# Patient Record
Sex: FEMALE | Race: OTHER | Employment: FULL TIME | URBAN - METROPOLITAN AREA
[De-identification: names, ages, dates, MRNs, and addresses within clinical notes are randomized per-mention and may not be internally consistent; named-entity substitution may affect disease eponyms.]

---

## 2021-01-05 ENCOUNTER — TRANSCRIBE ORDER (OUTPATIENT)
Dept: REGISTRATION | Age: 66
End: 2021-01-05

## 2021-01-05 NOTE — PERIOP NOTES
PAT - SURGICAL PRE-ADMISSION INSTRUCTIONS    NAME:  Armaan Porras                                                          TODAY'S DATE:  1/5/2021    SURGERY DATE:  1/11/2021                                  SURGERY ARRIVAL TIME:   0530 TBV    1. Do NOT eat or drink anything, including candy or gum, after MIDNIGHT on 1/10/21 , unless you have specific instructions from your Surgeon or Anesthesia Provider to do so. 2. No smoking on the day of surgery. 3. No alcohol 24 hours prior to the day of surgery. 4. No recreational drugs for one week prior to the day of surgery. 5. Leave all valuables, including money/purse, at home. 6. Remove all jewelry, nail polish, makeup (including mascara); no lotions, powders, deodorant, or perfume/cologne/after shave. 7. Glasses/Contact lenses and Dentures may be worn to the hospital.  They will be removed prior to surgery. 8. Call your doctor if symptoms of a cold or illness develop within 24 ours prior to surgery. 9. AN ADULT MUST DRIVE YOU HOME AFTER OUTPATIENT SURGERY. 10. If you are having an OUTPATIENT procedure, please make arrangements for a responsible adult to be with you for 24 hours after your surgery. 11. If you are admitted to the hospital, you will be assigned to a bed after surgery is complete. Normally a family member will not be able to see you until you are in your assigned bed. 15. Family is encouraged to accompany you to the hospital.  We ask visitors in the treatment area to be limited to ONE person at a time to ensure patient privacy. EXCEPTIONS WILL BE MADE AS NEEDED. 15. Children under 12 are discouraged from entering the treatment area and need to be supervised by an adult when in the waiting room. Special Instructions:    NONE. Patient Prep:    shower with anti-bacterial soap    These surgical instructions were reviewed with PATIENT during the PAT PHONE CALL. Directions: On the morning of surgery, please go to the MAIN ENTRANCE.     Sign in at the Registration Desk.     If you have any questions and/or concerns, please do not hesitate to call:  (Prior to the day of surgery)  [facility] unit:  [phone]  (Day of surgery)  [facility] unit:  [phone]

## 2021-01-07 ENCOUNTER — HOSPITAL ENCOUNTER (OUTPATIENT)
Dept: PREADMISSION TESTING | Age: 66
Discharge: HOME | End: 2021-01-07
Payer: MEDICARE

## 2021-01-07 ENCOUNTER — TRANSCRIBE ORDER (OUTPATIENT)
Dept: REGISTRATION | Age: 66
End: 2021-01-07

## 2021-01-07 DIAGNOSIS — Z01.812 BLOOD TESTS PRIOR TO TREATMENT OR PROCEDURE: Primary | ICD-10-CM

## 2021-01-07 DIAGNOSIS — Z01.812 BLOOD TESTS PRIOR TO TREATMENT OR PROCEDURE: ICD-10-CM

## 2021-01-07 DIAGNOSIS — Z20.828 EXPOSURE TO SARS-ASSOCIATED CORONAVIRUS: ICD-10-CM

## 2021-01-07 PROCEDURE — 36415 COLL VENOUS BLD VENIPUNCTURE: CPT

## 2021-01-07 PROCEDURE — 87635 SARS-COV-2 COVID-19 AMP PRB: CPT

## 2021-01-08 LAB — SARS-COV-2, COV2NT: NOT DETECTED

## 2021-01-11 ENCOUNTER — HOSPITAL ENCOUNTER (OUTPATIENT)
Age: 66
Setting detail: OUTPATIENT SURGERY
Discharge: HOME | End: 2021-01-11
Attending: PODIATRIST | Admitting: PODIATRIST
Payer: MEDICARE

## 2021-01-11 ENCOUNTER — ANESTHESIA (OUTPATIENT)
Dept: SURGERY | Age: 66
End: 2021-01-11
Payer: MEDICARE

## 2021-01-11 ENCOUNTER — ANESTHESIA EVENT (OUTPATIENT)
Dept: SURGERY | Age: 66
End: 2021-01-11
Payer: MEDICARE

## 2021-01-11 VITALS
HEART RATE: 60 BPM | HEIGHT: 63 IN | TEMPERATURE: 97.4 F | DIASTOLIC BLOOD PRESSURE: 51 MMHG | RESPIRATION RATE: 14 BRPM | SYSTOLIC BLOOD PRESSURE: 94 MMHG | WEIGHT: 128.7 LBS | BODY MASS INDEX: 22.8 KG/M2 | OXYGEN SATURATION: 100 %

## 2021-01-11 PROCEDURE — 74011250636 HC RX REV CODE- 250/636: Performed by: NURSE ANESTHETIST, CERTIFIED REGISTERED

## 2021-01-11 PROCEDURE — 74011000250 HC RX REV CODE- 250: Performed by: PODIATRIST

## 2021-01-11 PROCEDURE — 2709999900 HC NON-CHARGEABLE SUPPLY: Performed by: PODIATRIST

## 2021-01-11 PROCEDURE — 01480 ANES OPEN PX LOWER L/A/F NOS: CPT | Performed by: ANESTHESIOLOGY

## 2021-01-11 PROCEDURE — 77030036714 HC WRE K FIXOS STRY -B: Performed by: PODIATRIST

## 2021-01-11 PROCEDURE — 77030040361 HC SLV COMPR DVT MDII -B: Performed by: PODIATRIST

## 2021-01-11 PROCEDURE — 77030020833 HC CAP PROTCT PIN ASPN -A: Performed by: PODIATRIST

## 2021-01-11 PROCEDURE — 74011250636 HC RX REV CODE- 250/636: Performed by: PODIATRIST

## 2021-01-11 PROCEDURE — 76060000036 HC ANESTHESIA 2.5 TO 3 HR: Performed by: PODIATRIST

## 2021-01-11 PROCEDURE — 77030000032 HC CUF TRNQT ZIMM -B: Performed by: PODIATRIST

## 2021-01-11 PROCEDURE — 76210000021 HC REC RM PH II 0.5 TO 1 HR: Performed by: PODIATRIST

## 2021-01-11 PROCEDURE — C1713 ANCHOR/SCREW BN/BN,TIS/BN: HCPCS | Performed by: PODIATRIST

## 2021-01-11 PROCEDURE — 77030010507 HC ADH SKN DERMBND J&J -B: Performed by: PODIATRIST

## 2021-01-11 PROCEDURE — 74011000250 HC RX REV CODE- 250: Performed by: NURSE ANESTHETIST, CERTIFIED REGISTERED

## 2021-01-11 PROCEDURE — 74011000258 HC RX REV CODE- 258: Performed by: NURSE ANESTHETIST, CERTIFIED REGISTERED

## 2021-01-11 PROCEDURE — 77030020753 HC CUF TRNQT 1BLA STRY -B: Performed by: PODIATRIST

## 2021-01-11 PROCEDURE — 77030002933 HC SUT MCRYL J&J -A: Performed by: PODIATRIST

## 2021-01-11 PROCEDURE — 77030006773 HC BLD SAW OSC BRSM -A: Performed by: PODIATRIST

## 2021-01-11 PROCEDURE — 74011000272 HC RX REV CODE- 272: Performed by: PODIATRIST

## 2021-01-11 PROCEDURE — 77030031139 HC SUT VCRL2 J&J -A: Performed by: PODIATRIST

## 2021-01-11 PROCEDURE — 76010000132 HC OR TIME 2.5 TO 3 HR: Performed by: PODIATRIST

## 2021-01-11 DEVICE — XPRESS STAPLE (8 X 8 X 8MM)
Type: IMPLANTABLE DEVICE | Site: TOE | Status: FUNCTIONAL
Brand: EASYCLIP

## 2021-01-11 DEVICE — CANNULATED SELF-TAPPING COMPRESSION SCREW
Type: IMPLANTABLE DEVICE | Site: FOOT | Status: FUNCTIONAL
Brand: FIXOS

## 2021-01-11 RX ORDER — DEXAMETHASONE SODIUM PHOSPHATE 4 MG/ML
INJECTION, SOLUTION INTRA-ARTICULAR; INTRALESIONAL; INTRAMUSCULAR; INTRAVENOUS; SOFT TISSUE AS NEEDED
Status: DISCONTINUED | OUTPATIENT
Start: 2021-01-11 | End: 2021-01-11 | Stop reason: HOSPADM

## 2021-01-11 RX ORDER — LIDOCAINE HYDROCHLORIDE 20 MG/ML
INJECTION, SOLUTION EPIDURAL; INFILTRATION; INTRACAUDAL; PERINEURAL AS NEEDED
Status: DISCONTINUED | OUTPATIENT
Start: 2021-01-11 | End: 2021-01-11 | Stop reason: HOSPADM

## 2021-01-11 RX ORDER — CEFAZOLIN SODIUM 2 G/50ML
2 SOLUTION INTRAVENOUS ONCE
Status: COMPLETED | OUTPATIENT
Start: 2021-01-11 | End: 2021-01-11

## 2021-01-11 RX ORDER — FENTANYL CITRATE 50 UG/ML
INJECTION, SOLUTION INTRAMUSCULAR; INTRAVENOUS AS NEEDED
Status: DISCONTINUED | OUTPATIENT
Start: 2021-01-11 | End: 2021-01-11 | Stop reason: HOSPADM

## 2021-01-11 RX ORDER — MAGNESIUM SULFATE 100 %
4 CRYSTALS MISCELLANEOUS AS NEEDED
Status: CANCELLED | OUTPATIENT
Start: 2021-01-11

## 2021-01-11 RX ORDER — SODIUM CHLORIDE 0.9 % (FLUSH) 0.9 %
5-40 SYRINGE (ML) INJECTION AS NEEDED
Status: CANCELLED | OUTPATIENT
Start: 2021-01-11

## 2021-01-11 RX ORDER — ONDANSETRON 2 MG/ML
INJECTION INTRAMUSCULAR; INTRAVENOUS AS NEEDED
Status: DISCONTINUED | OUTPATIENT
Start: 2021-01-11 | End: 2021-01-11 | Stop reason: HOSPADM

## 2021-01-11 RX ORDER — PROPOFOL 10 MG/ML
INJECTION, EMULSION INTRAVENOUS
Status: DISCONTINUED | OUTPATIENT
Start: 2021-01-11 | End: 2021-01-11 | Stop reason: HOSPADM

## 2021-01-11 RX ORDER — ONDANSETRON 2 MG/ML
4 INJECTION INTRAMUSCULAR; INTRAVENOUS ONCE
Status: CANCELLED | OUTPATIENT
Start: 2021-01-11 | End: 2021-01-11

## 2021-01-11 RX ORDER — HYDROMORPHONE HYDROCHLORIDE 2 MG/ML
0.5 INJECTION, SOLUTION INTRAMUSCULAR; INTRAVENOUS; SUBCUTANEOUS
Status: CANCELLED | OUTPATIENT
Start: 2021-01-11

## 2021-01-11 RX ORDER — DEXTROSE 50 % IN WATER (D50W) INTRAVENOUS SYRINGE
25-50 AS NEEDED
Status: CANCELLED | OUTPATIENT
Start: 2021-01-11

## 2021-01-11 RX ORDER — PROPOFOL 10 MG/ML
INJECTION, EMULSION INTRAVENOUS AS NEEDED
Status: DISCONTINUED | OUTPATIENT
Start: 2021-01-11 | End: 2021-01-11 | Stop reason: HOSPADM

## 2021-01-11 RX ORDER — SODIUM CHLORIDE 0.9 % (FLUSH) 0.9 %
5-40 SYRINGE (ML) INJECTION EVERY 8 HOURS
Status: CANCELLED | OUTPATIENT
Start: 2021-01-11

## 2021-01-11 RX ORDER — MIDAZOLAM HYDROCHLORIDE 1 MG/ML
INJECTION, SOLUTION INTRAMUSCULAR; INTRAVENOUS AS NEEDED
Status: DISCONTINUED | OUTPATIENT
Start: 2021-01-11 | End: 2021-01-11 | Stop reason: HOSPADM

## 2021-01-11 RX ORDER — BUPIVACAINE HYDROCHLORIDE 5 MG/ML
INJECTION, SOLUTION EPIDURAL; INTRACAUDAL AS NEEDED
Status: DISCONTINUED | OUTPATIENT
Start: 2021-01-11 | End: 2021-01-11 | Stop reason: HOSPADM

## 2021-01-11 RX ORDER — SODIUM CHLORIDE, SODIUM LACTATE, POTASSIUM CHLORIDE, CALCIUM CHLORIDE 600; 310; 30; 20 MG/100ML; MG/100ML; MG/100ML; MG/100ML
100 INJECTION, SOLUTION INTRAVENOUS CONTINUOUS
Status: DISCONTINUED | OUTPATIENT
Start: 2021-01-11 | End: 2021-01-11 | Stop reason: HOSPADM

## 2021-01-11 RX ORDER — FENTANYL CITRATE 50 UG/ML
50 INJECTION, SOLUTION INTRAMUSCULAR; INTRAVENOUS AS NEEDED
Status: CANCELLED | OUTPATIENT
Start: 2021-01-11

## 2021-01-11 RX ADMIN — PHENYLEPHRINE HYDROCHLORIDE 100 MCG: 10 INJECTION INTRAVENOUS at 08:07

## 2021-01-11 RX ADMIN — ONDANSETRON 4 MG: 2 SOLUTION INTRAMUSCULAR; INTRAVENOUS at 09:37

## 2021-01-11 RX ADMIN — PROPOFOL 20 MG: 10 INJECTION, EMULSION INTRAVENOUS at 07:34

## 2021-01-11 RX ADMIN — PHENYLEPHRINE HYDROCHLORIDE 100 MCG: 10 INJECTION INTRAVENOUS at 07:54

## 2021-01-11 RX ADMIN — PHENYLEPHRINE HYDROCHLORIDE 100 MCG: 10 INJECTION INTRAVENOUS at 07:49

## 2021-01-11 RX ADMIN — MIDAZOLAM 2 MG: 1 INJECTION INTRAMUSCULAR; INTRAVENOUS at 07:28

## 2021-01-11 RX ADMIN — LIDOCAINE HYDROCHLORIDE 40 MG: 20 INJECTION, SOLUTION INTRAVENOUS at 07:34

## 2021-01-11 RX ADMIN — FENTANYL CITRATE 25 MCG: 50 INJECTION, SOLUTION INTRAMUSCULAR; INTRAVENOUS at 09:24

## 2021-01-11 RX ADMIN — PHENYLEPHRINE HYDROCHLORIDE 100 MCG: 10 INJECTION INTRAVENOUS at 08:12

## 2021-01-11 RX ADMIN — PROPOFOL 100 MCG/KG/MIN: 10 INJECTION, EMULSION INTRAVENOUS at 07:34

## 2021-01-11 RX ADMIN — PHENYLEPHRINE HYDROCHLORIDE 100 MCG: 10 INJECTION INTRAVENOUS at 08:00

## 2021-01-11 RX ADMIN — SODIUM CHLORIDE, SODIUM LACTATE, POTASSIUM CHLORIDE, AND CALCIUM CHLORIDE 100 ML/HR: 600; 310; 30; 20 INJECTION, SOLUTION INTRAVENOUS at 06:34

## 2021-01-11 RX ADMIN — FENTANYL CITRATE 25 MCG: 50 INJECTION, SOLUTION INTRAMUSCULAR; INTRAVENOUS at 07:34

## 2021-01-11 RX ADMIN — CEFAZOLIN 2 G: 10 INJECTION, POWDER, FOR SOLUTION INTRAVENOUS at 07:38

## 2021-01-11 RX ADMIN — PHENYLEPHRINE HYDROCHLORIDE 100 MCG: 10 INJECTION INTRAVENOUS at 08:25

## 2021-01-11 RX ADMIN — DEXAMETHASONE SODIUM PHOSPHATE 4 MG: 4 INJECTION, SOLUTION INTRA-ARTICULAR; INTRALESIONAL; INTRAMUSCULAR; INTRAVENOUS; SOFT TISSUE at 07:40

## 2021-01-11 NOTE — PERIOP NOTES
Phase 2 Recovery Summary    Report received from PACU    Vitals:    01/05/21 1201 01/11/21 0609 01/11/21 1011 01/11/21 1016   BP:  104/68 (!) 94/51 (!) 94/51   Pulse:  68 (!) 59 60   Resp:  18 18 14   Temp:  98.6 °F (37 °C) 97.4 °F (36.3 °C)    SpO2:  100% 100% 100%   Weight: 58.5 kg (129 lb) 58.4 kg (128 lb 11.2 oz)     Height: 5' 3\" (1.6 m) 5' 3\" (1.6 m)         oriented to time, place, person and situation          Lines and Drains  Peripheral Intravenous Line:      Wound  Incision 01/11/21 Foot Left (Active)   Dressing Status Clean;Dry 01/11/21 1053   Dressing/Treatment Gauze dressing/dressing sponge; Other (Comment); Xeroform 01/11/21 0855   Number of days: 0        Patient assisted to chair with minimal assist. Pateint tolerating liquids well. Discharge discussed with patient and family. No questions or concerns at this time. Patient had time to ask any questions. Denies pain. Discharge medications reviewed with patient and appropriate educational materials and side effects teaching were provided. Patient discharged to home with .        Jamshid Mendoza RN

## 2021-01-11 NOTE — ANESTHESIA PREPROCEDURE EVALUATION
Relevant Problems   No relevant active problems       Anesthetic History   No history of anesthetic complications            Review of Systems / Medical History  Patient summary reviewed and pertinent labs reviewed    Pulmonary  Within defined limits                 Neuro/Psych   Within defined limits           Cardiovascular  Within defined limits                Exercise tolerance: >4 METS     GI/Hepatic/Renal                Endo/Other        Arthritis     Other Findings              Physical Exam    Airway  Mallampati: III  TM Distance: 4 - 6 cm  Neck ROM: decreased range of motion   Mouth opening: Normal     Cardiovascular    Rhythm: regular  Rate: normal         Dental    Dentition: Poor dentition     Pulmonary  Breath sounds clear to auscultation               Abdominal  GI exam deferred       Other Findings            Anesthetic Plan    ASA: 2  Anesthesia type: MAC and general - backup          Induction: Intravenous  Anesthetic plan and risks discussed with: Patient

## 2021-01-11 NOTE — BRIEF OP NOTE
Brief Postoperative Note    Patient: Jory Vega  YOB: 1955  MRN: [id number]    Date of Procedure: 1/11/2021     Pre-Op Diagnosis: M20.42 M20.12 N94.836    Post-Op Diagnosis: Same as preoperative diagnosis. Procedure(s):  Dereje/José Left Arthrodesis 2nd Toe Left Foot    Surgeon(s): Anish Mathis DPM    Surgical Assistant: Surg Asst-1: Charline Silva    Anesthesia: General     Estimated Blood Loss (mL): Minimal    Complications: None    Specimens: * No specimens in log *     Implants:   Implant Name Type Inv. Item Serial No.  Lot No. LRB No. Used Action   STAPLE BNE XPRESS [id number] STRL -- EASY CLIP - [id number]  STAPLE BNE XPRESS [id number] STRL -- EASY CLIP  [employer] [id number] Left 1 Implanted   SCREW BNE L14MM DIA2. 5MM FORE/MIDFOOT TI ST SELF DRL MARILY - [id number]  SCREW BNE L14MM DIA2. 5MM FORE/MIDFOOT TI ST SELF DRL MARILY [id number] [employer] [facility]_  Left 1 Implanted   SCR BNE MARILY COMP 2.5X12MM -- FIXOS - [id number]  SCR BNE MARILY COMP 2.5X12MM -- FIXOS [id number] [employer] [facility]_  Left 1 Implanted   Double [address] . [address]  Left 1 Implanted       Drains: * No LDAs found *    Findings: DJD noted 1st MPJ left    Electronically Signed by Luana Birmingham DPM on 1/11/2021 at 9:38 AM

## 2021-01-11 NOTE — ANESTHESIA POSTPROCEDURE EVALUATION
Procedure(s):  Dereje/José Left Arthrodesis 2nd Toe Left Foot.     MAC, general - backup    Anesthesia Post Evaluation      Multimodal analgesia: multimodal analgesia used between 6 hours prior to anesthesia start to PACU discharge  Patient location during evaluation: bedside  Patient participation: complete - patient participated  Level of consciousness: awake  Pain management: adequate  Airway patency: patent  Anesthetic complications: no  Cardiovascular status: stable  Respiratory status: acceptable  Hydration status: acceptable  Post anesthesia nausea and vomiting:  controlled      INITIAL Post-op Vital signs:   Vitals Value Taken Time   BP 94/51 01/11/21 1016   Temp 36.3 °C (97.4 °F) 01/11/21 1011   Pulse 60 01/11/21 1016   Resp 14 01/11/21 1016   SpO2 100 % 01/11/21 1016

## 2021-01-11 NOTE — DISCHARGE INSTRUCTIONS
Patient Education    Wear boot at all times unless in bed. Always wear boot when ambulating. Patient armband removed and shredded  Learning About Coronavirus (COVID-19)  Coronavirus (COVID-19): Overview  What is coronavirus (IOBKD-33)? The coronavirus disease (COVID-19) is caused by a virus. It is an illness that was first found in [place], [place], in December 2019. It has since spread worldwide. The virus can cause fever, cough, and trouble breathing. In severe cases, it can cause pneumonia and make it hard to breathe without help. It can cause death. Coronaviruses are a large group of viruses. They cause the common cold. They also cause more serious illnesses like Middle East respiratory syndrome (MERS) and severe acute respiratory syndrome (SARS). COVID-19 is caused by a novel coronavirus. That means it's a new type that has not been seen in people before. This virus spreads person-to-person through droplets from coughing and sneezing. It can also spread when you are close to someone who is infected. And it can spread when you touch something that has the virus on it, such as a doorknob or a tabletop. What can you do to protect yourself from coronavirus (COVID-19)? The best way to protect yourself from getting sick is to:  · Avoid areas where there is an outbreak. · Avoid contact with people who may be infected. · Wash your hands often with soap or alcohol-based hand sanitizers. · Avoid crowds and try to stay at least 6 feet away from other people. · Wash your hands often, especially after you cough or sneeze. Use soap and water, and scrub for at least 20 seconds. If soap and water aren't available, use an alcohol-based hand . · Avoid touching your mouth, nose, and eyes. What can you do to avoid spreading the virus to others? To help avoid spreading the virus to others:  · Cover your mouth with a tissue when you cough or sneeze. Then throw the tissue in the trash.   · Use a disinfectant to clean things that you touch often. · Stay home if you are sick or have been exposed to the virus. Don't go to school, work, or public areas. And don't use public transportation. · If you are sick:  ? Leave your home only if you need to get medical care. But call the doctor's office first so they know you're coming. And wear a face mask, if you have one.  ? If you have a face mask, wear it whenever you're around other people. It can help stop the spread of the virus when you cough or sneeze. ? Clean and disinfect your home every day. Use household  and disinfectant wipes or sprays. Take special care to clean things that you grab with your hands. These include doorknobs, remote controls, phones, and handles on your refrigerator and microwave. And don't forget countertops, tabletops, bathrooms, and computer keyboards. When to call for help  Call 911 anytime you think you may need emergency care. For example, call if:  · You have severe trouble breathing. (You can't talk at all.)  · You have constant chest pain or pressure. · You are severely dizzy or lightheaded. · You are confused or can't think clearly. · Your face and lips have a blue color. · You pass out (lose consciousness) or are very hard to wake up. Call your doctor now if you develop symptoms such as:  · Shortness of breath. · Fever. · Cough. If you need to get care, call ahead to the doctor's office for instructions before you go. Make sure you wear a face mask, if you have one, to prevent exposing other people to the virus. Where can you get the latest information? The following health organizations are tracking and studying this virus. Their websites contain the most up-to-date information. Shanda Miner also learn what to do if you think you may have been exposed to the virus. · U.S. Centers for Disease Control and Prevention (CDC): The CDC provides updated news about the disease and travel advice.  The website also tells you how to prevent the spread of infection. www.cdc.gov  · World Health Organization [facility]): WHO offers information about the virus outbreaks. WHO also has travel advice. www.who.int  Current as of: April 1, 2020               Content Version: 12.4  © 2006-2020 Health[employer]. Care instructions adapted under license by your healthcare professional. If you have questions about a medical condition or this instruction, always ask your healthcare professional. [address] any warranty or liability for your use of this information. DISCHARGE SUMMARY from Nurse    PATIENT INSTRUCTIONS:    After general anesthesia or intravenous sedation, for 24 hours or while taking prescription Narcotics:  · Limit your activities  · Do not drive and operate hazardous machinery  · Do not make important personal or business decisions  · Do  not drink alcoholic beverages  · If you have not urinated within 8 hours after discharge, please contact your surgeon on call. Report the following to your surgeon:  · Excessive pain, swelling, redness or odor of or around the surgical area  · Temperature over 100.5  · Nausea and vomiting lasting longer than 4 hours or if unable to take medications  · Any signs of decreased circulation or nerve impairment to extremity: change in color, persistent  numbness, tingling, coldness or increase pain  · Any questions      These are general instructions for a healthy lifestyle:    No smoking/ No tobacco products/ Avoid exposure to second hand smoke  Surgeon General's Warning:  Quitting smoking now greatly reduces serious risk to your health.     Obesity, smoking, and sedentary lifestyle greatly increases your risk for illness    A healthy diet, regular physical exercise & weight monitoring are important for maintaining a healthy lifestyle    You may be retaining fluid if you have a history of heart failure or if you experience any of the following symptoms:  Weight gain of 3 pounds or more overnight or 5 pounds in a week, increased swelling in our hands or feet or shortness of breath while lying flat in bed. Please call your doctor as soon as you notice any of these symptoms; do not wait until your next office visit. The discharge information has been reviewed with the patient. The patient verbalized understanding. Discharge medications reviewed with the patient and appropriate educational materials and side effects teaching were provided.   ___________________________________________________________________________________________________________________________________

## 2021-01-11 NOTE — PERIOP NOTES
Pre-Op Summary    Pt arrived via car with family/friend and is oriented to time, place, person and situation. Patient with steady gait with none assistive devices. Visit Vitals  /68 (BP 1 Location: Right arm, BP Patient Position: Sitting)   Pulse 68   Temp 98.6 °F (37 °C)   Resp 18   Ht 5' 3\" (1.6 m)   Wt 58.4 kg (128 lb 11.2 oz)   SpO2 100%   BMI 22.80 kg/m²       Peripheral IV located on Left forearm. Patients belongings are located locked in store room. Patient's point of contact is Sylvester Flores,  and their contact number is: [phone]. They will be called for . They are able to receive medication information. They will be their ride home.

## 2021-01-12 NOTE — OP NOTES
[facility]  OPERATIVE REPORT    Name:  Bob Baron  MR#:   [id number]  :  1955  ACCOUNT #:  [de-identified]  DATE OF SERVICE:  2021    PREOPERATIVE DIAGNOSES:  1.  Painful hallux valgus deformity, left foot. 2.  Painful hammertoe deformity, left foot. POSTOPERATIVE DIAGNOSES:  1.  Painful hallux valgus deformity, left foot. 2.  Painful hammertoe deformity second digit, left foot. PROCEDURE PERFORMED:  1. Dereje José, left foot. 2.  Arthrodesis second digit, left foot. SURGEON:  MIGUEL Haq DPM    ASSISTANT:  Isaias Sanchez    ANESTHESIA:  MAC along with ankle and Laird block of the left foot. HEMOSTASIS:  Ankle tourniquet at 250 mmHg. MATERIALS:  8 x 8 x 8 EasyClip; 2.5 x 14 mm, 2.5 x 12 mm Fixos screws; 2-0 Vicryl; 3-0 Vicryl; 4-0 Monocryl and Dermabond; also 0.062 K-wire. COMPLICATIONS:  None. SPECIMENS REMOVED:  none    ESTIMATED BLOOD LOSS:  Negligible. INDICATION:  The patient is a 58-year-old female who presented to my office about a year ago complaining of increasing pain with shoe gear and ambulation of left greater than right foot. The patient received conservative treatment consisting of oral anti-inflammatories, topical anti-inflammatories, Prefab Orthotic Devices, decrease in weightbearing activity, and change in shoe gear which have all been unable to render her asymptomatic, and she would benefit from surgical intervention at this time. PROCEDURE:  The patient was brought to the operating room and placed in the supine position. A time-out was preformed and she received IV sedation along with local infiltration utilizing a total of 20 mL of a 1:1 mixture of 1% Xylocaine plain plus 0.5% Marcaine plain via ankle and Laird block of the left foot. The left foot was then scrubbed, prepped, and draped in a normal aseptic manner.   A second time-out was performed and laterality was confirmed, and at this time, Esmarch was used to exsanguinate the left foot and ankle tourniquet was then inflated to 250 mmHg. After it was determined that she was adequately anesthetized, attention was then directed to the dorsal aspect of the hallux and first metatarsal, where with a #15 blade a curvilinear incision was performed. The incision was deepened via sharp and blunt dissection until identification of the extensor hallucis longus tendon which was gently retracted laterally. Sharp dissection continued over the proximal phalanx into the surgical field with a TPS saw and closing wedge osteotomy was performed and fixated with an 8 x 8 x 8 EasyClip. At this time, with a linear incision in the first MPJ capsule with a 15 blade with sharp dissection, delivery of the head of the 1st metatarsal into the surgical field was then performed. Attention was then placed laterally where the attachment sites of the adductor hallucis was transected further reducing the pull on the proximal phalanx. A medial eminence was noted at the head of the first metatarsal, which was transected with a TPS saw and passed from the surgical field. Using a K-wire as apex guide for the V-osteotomy which was performed in the head and shaft of the first MPJ, and the capital fragment was gently retracted laterally. After this was performed and adequate placement was then noted, C-arm was used to inspect the capital fragment and as well as find out placement for the cannulated screws. At this time, K-wire was used to adequately place drill holes for the cannulated screws and this position was checked on the C-arm guidance. Once the correct positions for the screws were then obtained, a drill bit was performed drilling the near cortex. Once the adequate placement of the screws were then seen on the C-arm guidance, the depth was then noted and measured 14 mm as well as 12 mm on the proximal screw placement, and then these were then placed in order to compress the fragment adequately.   Redundant bone which was left from the lateral displacement of the capital fragment was then resected with the TPS saw. The area was then flushed and inspected for stability. The capsule and deep layers were reapproximated with 2-0 Vicryl via continuous sutures and a capsulorrhaphy was performed as well in the capsule of the first MPJ further reducing any lateral displacement of the hallux. Subcuticular layer was then reapproximated with 3-0 Vicryl via simple sutures. A damp 4 x 4 was then placed over this incision site. At this time, attention was then directed to the second digit where with a 15 blade, a linear incision was performed from the middle shaft of the middle phalanx extending proximal to the midshaft of the proximal phalanx. The incision was deepened via sharp and blunt dissection, tying and bovieing all bleeders until definite identification of the PIPJ, the digit was then plantarflexed and dorsal lateral and medial collateral ligaments were transected delivering the head of the proximal phalanx into the surgical field. TPS saw was then utilized to remove the capital fragment which was passed from the surgical field. Attention then was directed to the base of the middle phalanx and cartilaginous structures were removed from the middle phalanx. Originally, I was supposed to use a 2.0 cannulated screw to fixate my hammertoe deformity, but it was noted on the C-arm guidance that there was slight dislocation of the base of the proximal phalanx on the head of the second MPJ. So, decided to use a  0.062 K-wire and the digit was retrograde fixated to the proximal phalanx with plantarflexion of the second digit in order to reduce the dorsal dislocation of the second MPJ and the K-wire was then advanced into the second metatarsal.  This reduction was then visualized on ultrasound, C-arm and noted to be in the rectus position.   Once this was performed, the deep layers were then reapproximated with 2-0 Vicryl, subcuticular layer was reapproximated with 3-0 Vicryl both via continuous sutures, and the skin of both the suture sites were then reapproximated with 4-0 Monocryl via a subcuticular stitch. At this time, Dermabond was then placed over the closed incision sites and allowed to dry adequately and tourniquet was deflated. Once this was done, there was very slow cap return to the second digit, so I decided to back the K-wire out slightly, passed back out of the head of the second metatarsal only to the base of the proximal phalanx, and I will splint the digit in a plantarflexed position instead of using the K-wire. Once this was obtained, adequate perfusion to the second digit was noted and a cap was placed over the prepared K-wire. The closed incision sites were then dressed with 4 x 4s, fluffs, Caitie, and a 4-inch Ace wrap. The patient tolerated the procedure and anesthesia well and left the OR for PACU in satisfactory condition. She will have a postop visit within 48 hours. She is to remain nonweightbearing and keep the boot on until her postop visit in 48 hours.         Rudy Miller DPM      [id number]  D:  01/11/2021 13:56  T:  01/11/2021 23:30  JOB #:  [id number]

## (undated) DEVICE — PREP SKN CHLRAPRP 26ML TNT -- CONVERT TO ITEM 373320

## (undated) DEVICE — BANDAGE,GAUZE,BULKEE II,4.5"X4.1YD,STRL: Brand: MEDLINE

## (undated) DEVICE — ROCKER SWITCH PENCIL HOLSTER: Brand: VALLEYLAB

## (undated) DEVICE — REM POLYHESIVE ADULT PATIENT RETURN ELECTRODE: Brand: VALLEYLAB

## (undated) DEVICE — TRNQT RMFG CUFF SGL BLDR SGL P -- LAWSON OEM ITEM 338161

## (undated) DEVICE — NEEDLE HYPO 25GA L1.5IN BLU POLYPR HUB S STL REG BVL STR

## (undated) DEVICE — BNDG CMPR ELAS KNT VEL STD 4IN -- MEDICHOICE

## (undated) DEVICE — NEEDLE HYPO 25GA L1.5IN BVL ORIENTED ECLIPSE

## (undated) DEVICE — NDL PRT INJ NSAF BLNT 18GX1.5 --

## (undated) DEVICE — DISPOSABLE TOURNIQUET CUFF SINGLE BLADDER, SINGLE PORT AND QUICK CONNECT CONNECTOR: Brand: COLOR CUFF

## (undated) DEVICE — BANDAGE,ELASTIC,ESMARK,STERILE,4"X9',LF: Brand: MEDLINE

## (undated) DEVICE — DRAPE,EXTREMITY,89X128,STERILE: Brand: MEDLINE

## (undated) DEVICE — SUTURE VCRL SZ 2-0 L27IN ABSRB UD L26MM SH 1/2 CIR J417H

## (undated) DEVICE — DERMABOND SKIN ADH 0.7ML -- DERMABOND ADVANCED 12/BX

## (undated) DEVICE — SOL IRRIGATION INJ NACL 0.9% 500ML BTL

## (undated) DEVICE — INTENDED FOR TISSUE SEPARATION, AND OTHER PROCEDURES THAT REQUIRE A SHARP SURGICAL BLADE TO PUNCTURE OR CUT.: Brand: BARD-PARKER ® CARBON RIB-BACK BLADES

## (undated) DEVICE — GARMENT,MEDLINE,DVT,INT,CALF,MED, GEN2: Brand: MEDLINE

## (undated) DEVICE — KIRSCHNER WIRE , L, MARKED, TIPS TROCAR

## (undated) DEVICE — DEPAUL UPPER EXTREMITY PACK: Brand: MEDLINE INDUSTRIES, INC.

## (undated) DEVICE — DRESSING,GAUZE,XEROFORM,CURAD,1"X8",ST: Brand: CURAD

## (undated) DEVICE — SUTURE VCRL SZ 3-0 L27IN ABSRB UD L19MM PS-2 3/8 CIR PRIM J427H

## (undated) DEVICE — SUTURE MCRYL SZ 4-0 L18IN ABSRB UD L19MM PS-2 3/8 CIR PRIM Y496G

## (undated) DEVICE — SINGLE USE DEVICE INTENDED TO COVER EXPOSED ENDS OF ORTHOPEDIC PIN AND K-WIRES TO HELP PROTECT THE EXPOSED WIRE FROM SNAGGING ON CLOTHING.: Brand: OXBORO™ PIN COVER

## (undated) DEVICE — SYR 10ML CTRL LR LCK NSAF LF --

## (undated) DEVICE — CANNULATED DRILL BIT & COUNTERSINK

## (undated) DEVICE — Device